# Patient Record
Sex: MALE | Race: WHITE | HISPANIC OR LATINO | URBAN - METROPOLITAN AREA
[De-identification: names, ages, dates, MRNs, and addresses within clinical notes are randomized per-mention and may not be internally consistent; named-entity substitution may affect disease eponyms.]

---

## 2019-01-31 ENCOUNTER — EMERGENCY (EMERGENCY)
Facility: HOSPITAL | Age: 39
LOS: 1 days | Discharge: HOME | End: 2019-01-31
Attending: EMERGENCY MEDICINE | Admitting: EMERGENCY MEDICINE
Payer: MEDICAID

## 2019-01-31 VITALS
WEIGHT: 229.94 LBS | DIASTOLIC BLOOD PRESSURE: 72 MMHG | HEIGHT: 67 IN | OXYGEN SATURATION: 96 % | TEMPERATURE: 98 F | HEART RATE: 87 BPM | SYSTOLIC BLOOD PRESSURE: 133 MMHG | RESPIRATION RATE: 18 BRPM

## 2019-01-31 VITALS
DIASTOLIC BLOOD PRESSURE: 67 MMHG | TEMPERATURE: 98 F | RESPIRATION RATE: 18 BRPM | SYSTOLIC BLOOD PRESSURE: 104 MMHG | HEART RATE: 82 BPM | OXYGEN SATURATION: 97 %

## 2019-01-31 DIAGNOSIS — Z90.49 ACQUIRED ABSENCE OF OTHER SPECIFIED PARTS OF DIGESTIVE TRACT: Chronic | ICD-10-CM

## 2019-01-31 PROCEDURE — 99283 EMERGENCY DEPT VISIT LOW MDM: CPT

## 2019-01-31 RX ORDER — SIMVASTATIN 20 MG/1
1 TABLET, FILM COATED ORAL
Qty: 0 | Refills: 0 | COMMUNITY

## 2019-01-31 RX ORDER — ONDANSETRON 8 MG/1
1 TABLET, FILM COATED ORAL
Qty: 6 | Refills: 0 | OUTPATIENT
Start: 2019-01-31 | End: 2019-02-01

## 2019-01-31 NOTE — ED PROVIDER NOTE - OBJECTIVE STATEMENT
38 yr old male with hx of HLD presents c/o episode of nausea, vomiting x 1 and diarrhea x 1 today. 38 yr old male with hx of HLD presents c/o episode of nausea, vomiting x 1 and diarrhea x 1 today. Denies any abdominal pain, urinary complaints or any other symptoms at this time. No symptoms at this time.

## 2019-01-31 NOTE — ED ADULT TRIAGE NOTE - HEIGHT IN INCHES
Patient due for f/u colonoscopy.  Currently lives in [place].  Sent letter to patient to contact current PCP to arrange procedure.     7

## 2019-01-31 NOTE — ED ADULT NURSE REASSESSMENT NOTE - NS ED NURSE REASSESS COMMENT FT1
Patient received resting comfortably on stretcher. No acute distress noted. PO challenged initiated by Dr. Link PO challenge tolerated. Awaiting disposition. Will continue to monitor.

## 2019-01-31 NOTE — ED PROVIDER NOTE - PROGRESS NOTE DETAILS
Pt feeling better. No n/v/d in ED. Pt tolerating PO. Abdomen- soft, non tender. Pt stable for discharge and to follow up with pmd.

## 2019-01-31 NOTE — ED PROVIDER NOTE - ATTENDING CONTRIBUTION TO CARE
Jose Angel with [facility]. Patient with reported N/V/D x 1 episode today. He feels better now. He had episode of feeling flushed and as if he would pass out but it resolved. He feels 100% now. Exam with soft and non tender abdomen. ENT exam normal. Lungs clear. Color normal. Vital signs stable. Afebrile. Advised for PO challenge and PO hydration in ED. WIll observe. Reassess. I performed a face to face bedside interview with patient regarding history of present illness, review of symptoms and past medical history. I completed an independent physical exam.  I have discussed the patient's plan of care with Physician Assistant (PA). I agree with note as stated above, having amended the EMR as needed to reflect my findings.   This includes History of Present Illness, HIV, Past Medical/Surgical/Family/Social History, Allergies and Home Medications, Review of Systems, Physical Exam, and any Progress Notes during the time I functioned as the attending physician for this patient.

## 2019-01-31 NOTE — ED ADULT NURSE NOTE - NSIMPLEMENTINTERV_GEN_ALL_ED
Implemented All Fall Risk Interventions:  [place] to call system. Call bell, personal items and telephone within reach. Instruct patient to call for assistance. Room bathroom lighting operational. Non-slip footwear when patient is off stretcher. Physically safe environment: no spills, clutter or unnecessary equipment. Stretcher in lowest position, wheels locked, appropriate side rails in place. Provide visual cue, wrist band, yellow gown, etc. Monitor gait and stability. Monitor for mental status changes and reorient to person, place, and time. Review medications for side effects contributing to fall risk. Reinforce activity limits and safety measures with patient and family.

## 2019-01-31 NOTE — ED PROVIDER NOTE - MEDICAL DECISION MAKING DETAILS
38 yr old male with hx of HLD presents c/o episode of nausea, vomiting x 1 and diarrhea x 1 today.: no symptoms in ed, abdomen- soft non tender, po challenge, supporative tx, fu with pmd.

## 2020-01-28 PROBLEM — E78.00 PURE HYPERCHOLESTEROLEMIA, UNSPECIFIED: Chronic | Status: ACTIVE | Noted: 2019-01-31

## 2020-01-31 ENCOUNTER — APPOINTMENT (OUTPATIENT)
Dept: UROLOGY | Facility: CLINIC | Age: 40
End: 2020-01-31
Payer: MEDICAID

## 2020-01-31 VITALS
SYSTOLIC BLOOD PRESSURE: 143 MMHG | HEIGHT: 67 IN | HEART RATE: 82 BPM | DIASTOLIC BLOOD PRESSURE: 64 MMHG | BODY MASS INDEX: 37.51 KG/M2 | RESPIRATION RATE: 14 BRPM | WEIGHT: 239 LBS | TEMPERATURE: 98.9 F

## 2020-01-31 DIAGNOSIS — Z63.5 DISRUPTION OF FAMILY BY SEPARATION AND DIVORCE: ICD-10-CM

## 2020-01-31 DIAGNOSIS — Z30.09 ENCOUNTER FOR OTHER GENERAL COUNSELING AND ADVICE ON CONTRACEPTION: ICD-10-CM

## 2020-01-31 DIAGNOSIS — Z78.9 OTHER SPECIFIED HEALTH STATUS: ICD-10-CM

## 2020-01-31 DIAGNOSIS — Z84.1 FAMILY HISTORY OF DISORDERS OF KIDNEY AND URETER: ICD-10-CM

## 2020-01-31 PROCEDURE — 99203 OFFICE O/P NEW LOW 30 MIN: CPT

## 2020-01-31 SDOH — SOCIAL STABILITY - SOCIAL INSECURITY: DISRUPTION OF FAMILY BY SEPARATION AND DIVORCE: Z63.5

## 2020-01-31 NOTE — HISTORY OF PRESENT ILLNESS
[FreeTextEntry1] : Patient is a 38 yo M who presents for vasectomy evaluation.  He has 3 children with his female partner, age 15, 7 and 2.  He and his partner have discussed and in agreement on sterility.  His female partner cannot have tubal ligation as her insurance is [employer] and this is not covered.\par Denies urologic complaints.  No scrotal pain.

## 2020-01-31 NOTE — REVIEW OF SYSTEMS
[Negative] : Heme/Lymph [Fever] : fever [Chills] : chills [Feeling Tired] : feeling tired [Dry Eyes] : dryness of the eyes [Earache] : earache [Sore Throat] : sore throat [Wheezing] : wheezing [Skin Lesions] : skin lesion [Itching] : itching

## 2020-01-31 NOTE — PHYSICAL EXAM
[General Appearance - Well Developed] : well developed [General Appearance - Well Nourished] : well nourished [Normal Appearance] : normal appearance [Well Groomed] : well groomed [General Appearance - In No Acute Distress] : no acute distress [Edema] : no peripheral edema [Respiration, Rhythm And Depth] : normal respiratory rhythm and effort [Exaggerated Use Of Accessory Muscles For Inspiration] : no accessory muscle use [Abdomen Soft] : soft [Abdomen Tenderness] : non-tender [Abdomen Hernia] : no hernia was discovered [Penis Abnormality] : normal uncircumcised penis [Urinary Bladder Findings] : the bladder was normal on palpation [Scrotum] : the scrotum was normal [Epididymis] : the epididymides were normal [Testes Tenderness] : no tenderness of the testes [Testes Mass (___cm)] : there were no testicular masses [FreeTextEntry1] : b/l palp vas [Normal Station and Gait] : the gait and station were normal for the patient's age [] : no rash [No Focal Deficits] : no focal deficits [Oriented To Time, Place, And Person] : oriented to person, place, and time [Affect] : the affect was normal [Mood] : the mood was normal [Not Anxious] : not anxious

## 2020-03-13 ENCOUNTER — OUTPATIENT (OUTPATIENT)
Dept: OUTPATIENT SERVICES | Facility: HOSPITAL | Age: 40
LOS: 1 days | End: 2020-03-13
Payer: MEDICAID

## 2020-03-13 ENCOUNTER — APPOINTMENT (OUTPATIENT)
Dept: UROLOGY | Facility: CLINIC | Age: 40
End: 2020-03-13
Payer: MEDICAID

## 2020-03-13 ENCOUNTER — LABORATORY RESULT (OUTPATIENT)
Age: 40
End: 2020-03-13

## 2020-03-13 VITALS — SYSTOLIC BLOOD PRESSURE: 118 MMHG | DIASTOLIC BLOOD PRESSURE: 64 MMHG | RESPIRATION RATE: 16 BRPM | HEART RATE: 80 BPM

## 2020-03-13 DIAGNOSIS — Z90.49 ACQUIRED ABSENCE OF OTHER SPECIFIED PARTS OF DIGESTIVE TRACT: Chronic | ICD-10-CM

## 2020-03-13 DIAGNOSIS — Z30.2 ENCOUNTER FOR STERILIZATION: ICD-10-CM

## 2020-03-13 DIAGNOSIS — R35.0 FREQUENCY OF MICTURITION: ICD-10-CM

## 2020-03-13 PROCEDURE — 55250 REMOVAL OF SPERM DUCT(S): CPT

## 2020-03-19 DIAGNOSIS — Z30.2 ENCOUNTER FOR STERILIZATION: ICD-10-CM

## 2020-03-20 ENCOUNTER — APPOINTMENT (OUTPATIENT)
Age: 40
End: 2020-03-20

## 2021-09-30 NOTE — ASSESSMENT
[FreeTextEntry1] : Patient is a 38 yo M who presents for vasectomy evaluation.\par \par -Discussed with pt that vasectomy is permanent sterilization procedure\par -D/w pt that vasectomy is reversible it depends a number of factors for success and should not be an expectation postoperatively\par -he is aware and both he and wife have discussed vasectomy for past several mos/yrs and are in agreement\par -d/w pt that he cannot engage in unprotected intercourse without risk of pregnancy until confirmation of sterility with semen analysis, likely will require 15-20 ejaculations and/or >3 months\par -d/w he cannot engage in sports/exercise/sexual activity for at least 1 wk after vasectomy\par -d/w pt that I do not perform no scalpel vasectomy, and that I use scalpel\par -d/w pt risks/benefits/alternatives of vasectomy, including female contraception\par -d/w pt risks of chronic orchalgia, infection, bleeding/hematoma\par -[place] sterilization consent signed, he will schedule at his convenience  [facility]

## 2022-02-14 ENCOUNTER — EMERGENCY (EMERGENCY)
Facility: HOSPITAL | Age: 42
LOS: 1 days | Discharge: HOME | End: 2022-02-14
Attending: EMERGENCY MEDICINE | Admitting: EMERGENCY MEDICINE
Payer: COMMERCIAL

## 2022-02-14 VITALS
DIASTOLIC BLOOD PRESSURE: 102 MMHG | WEIGHT: 240.08 LBS | SYSTOLIC BLOOD PRESSURE: 148 MMHG | HEIGHT: 67 IN | RESPIRATION RATE: 18 BRPM | HEART RATE: 84 BPM | OXYGEN SATURATION: 97 % | TEMPERATURE: 97 F

## 2022-02-14 DIAGNOSIS — Z90.49 ACQUIRED ABSENCE OF OTHER SPECIFIED PARTS OF DIGESTIVE TRACT: Chronic | ICD-10-CM

## 2022-02-14 PROCEDURE — 72074 X-RAY EXAM THORAC SPINE4/>VW: CPT | Mod: 26

## 2022-02-14 PROCEDURE — 99284 EMERGENCY DEPT VISIT MOD MDM: CPT

## 2022-02-14 PROCEDURE — 72074 X-RAY EXAM THORAC SPINE4/>VW: CPT

## 2022-02-14 PROCEDURE — 71045 X-RAY EXAM CHEST 1 VIEW: CPT | Mod: 26

## 2022-02-14 PROCEDURE — 99284 EMERGENCY DEPT VISIT MOD MDM: CPT | Mod: 25

## 2022-02-14 PROCEDURE — 71045 X-RAY EXAM CHEST 1 VIEW: CPT

## 2022-02-14 RX ORDER — IBUPROFEN 200 MG
600 TABLET ORAL ONCE
Refills: 0 | Status: COMPLETED | OUTPATIENT
Start: 2022-02-14 | End: 2022-02-14

## 2022-02-14 RX ADMIN — Medication 600 MILLIGRAM(S): at 01:37

## 2022-02-14 NOTE — ED PROVIDER NOTE - PATIENT PORTAL LINK FT
You can access the FollowMyHealth Patient Portal offered by [facility] by registering at the following website: [email or web address]. By joining [facility]’s FollowMyHealth portal, you will also be able to view your health information using other applications (apps) compatible with our system.

## 2022-02-14 NOTE — ED PROVIDER NOTE - CARE PROVIDER_API CALL
Jean Pierre Burt)  Orthopaedic Sports Medicine; Orthopaedic Surgery  [address]  [place] [address]  Phone: [phone]  Fax: [phone]  Follow Up Time: 1-3 Days

## 2022-02-14 NOTE — ED PROVIDER NOTE - CLINICAL SUMMARY MEDICAL DECISION MAKING FREE TEXT BOX
mid back pain s/p slip and fall. no focal back ttp. no spinal ttp. no neuro deficit.  no chest pain/sob to suggest intrathoracic injury. will cehck cxr, t spine xr, r/o spine fx, ptx. . motrin for pain. likely back contusion

## 2022-02-14 NOTE — ED PROVIDER NOTE - OBJECTIVE STATEMENT
pt c/o mid to upper back pain, moderate, sharp , since fall about 30min pta. pt going down 3 steps and slipped on ice landing back against edge of stair. did not hit head. no chest/abd pain. initially felt mild sob. no neck pain. no leg pain. no numbness/weakness

## 2022-02-14 NOTE — ED ADULT NURSE NOTE - OBJECTIVE STATEMENT
PT presents to the ED with c/o slip and fall down 3 steps outside. Pt c/o back pain and pain when he breathes. Denies head injury or LOC.

## 2022-02-14 NOTE — ED PROVIDER NOTE - NSFOLLOWUPINSTRUCTIONS_ED_ALL_ED_FT
- take motrin 400-600mg every 6 hrs for pain as needed    Back Pain    WHAT YOU NEED TO KNOW:    Back pain is common. It can be caused by many conditions, such as arthritis or the breakdown of spinal discs. Your risk for back pain is increased by injuries, lack of activity, or repeated bending and twisting. You may feel sore or stiff on one or both sides of your back. The pain may spread to your buttocks or thighs.    DISCHARGE INSTRUCTIONS:    Return to the emergency department if:     You have pain, numbness, or weakness in one or both legs.    Your pain becomes so severe that you cannot walk.    You cannot control your urine or bowel movements.    You have severe back pain with chest pain.    You have severe back pain, nausea, and vomiting.    You have severe back pain that spreads to your side or genital area.    Contact your healthcare provider if:     You have back pain that does not get better with rest and pain medicine.    You have a fever    You have pain that worsens when you are on your back or when you rest.    You have pain that worsens when you cough or sneeze.    You lose weight without trying.    You have questions or concerns about your condition or care.    Medicines:     NSAIDs help decrease swelling and pain. This medicine is available with or without a doctor's order. NSAIDs can cause stomach bleeding or kidney problems in certain people. If you take blood thinner medicine, always ask your healthcare provider if NSAIDs are safe for you. Always read the medicine label and follow directions.    Acetaminophen decreases pain and fever. It is available without a doctor's order. Ask how much to take and how often to take it. Follow directions. Read the labels of all other medicines you are using to see if they also contain acetaminophen, or ask your doctor or pharmacist. Acetaminophen can cause liver damage if not taken correctly. Do not use more than 4 grams (4,000 milligrams) total of acetaminophen in one day.     Muscle relaxers help decrease muscle spasms and back pain.    Prescription pain medicine may be given. Ask your healthcare provider how to take this medicine safely. Some prescription pain medicines contain acetaminophen. Do not take other medicines that contain acetaminophen without talking to your healthcare provider. Too much acetaminophen may cause liver damage. Prescription pain medicine may cause constipation. Ask your healthcare provider how to prevent or treat constipation.     Take your medicine as directed. Contact your healthcare provider if you think your medicine is not helping or if you have side effects. Tell him or her if you are allergic to any medicine. Keep a list of the medicines, vitamins, and herbs you take. Include the amounts, and when and why you take them. Bring the list or the pill bottles to follow-up visits. Carry your medicine list with you in case of an emergency.    How to manage your back pain:     Apply ice on your back for 15 to 20 minutes every hour or as directed. Use an ice pack, or put crushed ice in a plastic bag. Cover it with a towel before you apply it to your skin. Ice helps prevent tissue damage and decreases pain.    Apply heat on your back for 20 to 30 minutes every 2 hours for as many days as directed. Heat helps decrease pain and muscle spasms.    Stay active as much as you can without causing more pain. Bed rest could make your back pain worse. Avoid heavy lifting until your pain is gone.    Go to physical therapy as directed. A physical therapist can teach you exercises to help improve movement and strength, and to decrease pain.    Follow up with your healthcare provider in 2 weeks, or as directed: Write down your questions so you remember to ask them during your visits

## 2022-02-14 NOTE — ED PROVIDER NOTE - NEUROLOGICAL, MLM
Alert and oriented, no focal deficits, no motor or sensory deficits.  normal LE strength, senseation and ROM

## 2022-07-24 ENCOUNTER — NON-APPOINTMENT (OUTPATIENT)
Age: 42
End: 2022-07-24

## 2024-01-23 ENCOUNTER — APPOINTMENT (OUTPATIENT)
Dept: UROLOGY | Facility: CLINIC | Age: 44
End: 2024-01-23

## 2024-03-05 NOTE — ED PROVIDER NOTE - BIRTH SEX
10.0 03/04/2024 12:00 PM    HCT 30.6 03/04/2024 12:00 PM     03/04/2024 12:00 PM    MCV 92.8 03/04/2024 12:00 PM    MCH 30.5 03/04/2024 12:00 PM    MCHC 32.9 03/04/2024 12:00 PM    RDW 14.4 03/04/2024 12:00 PM    SEGSPCT 59.4 01/07/2012 05:21 PM    LYMPHOPCT 15.9 03/04/2024 12:00 PM    MONOPCT 8.8 03/04/2024 12:00 PM    EOSPCT 2.4 01/07/2012 05:21 PM    BASOPCT 0.6 03/04/2024 12:00 PM    MONOSABS 0.7 03/04/2024 12:00 PM    LYMPHSABS 1.2 03/04/2024 12:00 PM    EOSABS 0.4 03/04/2024 12:00 PM    BASOSABS 0.0 03/04/2024 12:00 PM    DIFFTYPE AUTOMATED DIFFERENTIAL 01/07/2012 05:21 PM       Discharge Medications:  The patient suffers from a major neurological surgery that pain cannot be managed within an average of 30 MED per day. Severe acute postoperative pain is the reason for exceeding the 30 MED average, and the prescription reflects the same dosage patient received while inpatient, which is the lowest dose consistent with the patient’s medical condition. Non-opioid treatment options have been considered prior to prescribing opioids, and the patient has been advised of the benefits and risks of the opioid (including the potential for addiction).     Medication List        START taking these medications      diazePAM 5 MG tablet  Commonly known as: VALIUM  Take 1 tablet by mouth every 8 hours as needed (muscle spasms) for up to 10 days. Max Daily Amount: 15 mg     HYDROcodone-acetaminophen  MG per tablet  Commonly known as: NORCO  Take 1 tablet by mouth every 6 hours as needed for Pain for up to 7 days. Max Daily Amount: 4 tablets  Replaces: HYDROcodone-acetaminophen 5-325 MG per tablet     methocarbamol 750 MG tablet  Commonly known as: ROBAXIN  Take 1 tablet by mouth every 6 hours as needed (muscle spasms)     polyethylene glycol 17 g packet  Commonly known as: GLYCOLAX  Take 1 packet by mouth daily for 10 days     sennosides-docusate sodium 8.6-50 MG tablet  Commonly known as: SENOKOT-S  Take 2 tablets 
Male